# Patient Record
Sex: FEMALE | Race: BLACK OR AFRICAN AMERICAN | NOT HISPANIC OR LATINO | URBAN - METROPOLITAN AREA
[De-identification: names, ages, dates, MRNs, and addresses within clinical notes are randomized per-mention and may not be internally consistent; named-entity substitution may affect disease eponyms.]

---

## 2022-07-05 ENCOUNTER — HOSPITAL ENCOUNTER (EMERGENCY)
Facility: HOSPITAL | Age: 22
Discharge: HOME | End: 2022-07-05
Attending: EMERGENCY MEDICINE
Payer: MEDICAID

## 2022-07-05 VITALS
OXYGEN SATURATION: 99 % | HEIGHT: 64 IN | SYSTOLIC BLOOD PRESSURE: 131 MMHG | BODY MASS INDEX: 28.17 KG/M2 | WEIGHT: 165 LBS | TEMPERATURE: 99 F | RESPIRATION RATE: 20 BRPM | HEART RATE: 90 BPM | DIASTOLIC BLOOD PRESSURE: 67 MMHG

## 2022-07-05 DIAGNOSIS — R60.0 LOWER EXTREMITY EDEMA: Primary | ICD-10-CM

## 2022-07-05 PROCEDURE — 99282 EMERGENCY DEPT VISIT SF MDM: CPT

## 2022-07-05 NOTE — Clinical Note
"Catrachito Ritter (Aarondayasia) was seen and treated in our emergency department on 7/5/2022.  She may return to work on 07/07/2022.       If you have any questions or concerns, please don't hesitate to call.      Allyssa Garcia RN    "

## 2022-07-05 NOTE — ED PROVIDER NOTES
Encounter Date: 7/5/2022    SCRIBE #1 NOTE: I, Gracy Nevarez, am scribing for, and in the presence of, Caio Barth MD.       History     Chief Complaint   Patient presents with    Foot Pain     Bilateral foot pain for over a week, worsen with standing, was suppose to go to work this am but reports swelling in feet. No trauma noted and no discoloration noted. +pedal pulse in BLE     Catrachito Ritter is a 22 y.o. female who  has no past medical history on file.    The patient presents to the ED for evaluation of bilateral feet swelling and pain.   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 However, she states her swelling and pain to bilateral lower extremities progressively worsened over this last week. Her pain is exacerbated when she is standing/applying pressure to her feet. She also reports decreased appetite. She denies any shortness of breath. No other complaints reported at this time.     The history is provided by the patient. No  was used.     Review of patient's allergies indicates:  No Known Allergies  No past medical history on file.  No past surgical history on file.  No family history on file.     Review of Systems   Constitutional: Positive for appetite change.   Respiratory: Negative for shortness of breath.    Cardiovascular: Positive for leg swelling.   Musculoskeletal: Positive for myalgias.   Skin: Negative for color change.   Neurological: Negative for numbness.   All other systems reviewed and are negative.      Physical Exam     Initial Vitals [07/05/22 0734]   BP Pulse Resp Temp SpO2   131/67 90 20 98.9 °F (37.2 °C) 99 %      MAP       --         Physical Exam    Nursing note and vitals reviewed.  Constitutional: No distress.   HENT:   Head: Atraumatic.   Eyes: Conjunctivae and EOM are normal.   Neck: Neck supple.   Cardiovascular: Normal rate, regular rhythm and normal heart sounds.    Pulmonary/Chest: Breath sounds normal. No respiratory distress.   Musculoskeletal:      Cervical back: Neck supple.      Comments: Trace edema to bilateral lower extremities.  No calf tenderness.  Negative Homans sign bilaterally.  Palpable pulses in both feet.     Neurological: She is alert and oriented to person, place, and time.   Skin: Skin is warm and dry. No erythema.   Psychiatric: She has a normal mood and affect. Thought content normal.         ED Course   Procedures  Labs Reviewed - No data to display       Imaging Results    None          Medications - No data to display  Medical Decision Making:   Initial Assessment:   22-year-old female presents to the ED for evaluation of bilateral feet swelling and pain.   ED Management:  Patient with dependent edema of both lower legs.  I have explained to the patient to elevate her legs today and then apply compression stockings.  I have no suspicions of a DVT.  She may follow-up with the primary physician.  She is also welcome to return to the ED for any possible worsening.          Scribe Attestation:   Scribe #1: I performed the above scribed service and the documentation accurately describes the services I performed. I attest to the accuracy of the note.                 Clinical Impression:   Final diagnoses:  [R60.0] Lower extremity edema (Primary)          ED Disposition Condition    Discharge Stable        ED Prescriptions     None        Follow-up Information     Follow up With Specialties Details Why Contact Info    Your primary physician             I, Dr. Caio Barth, personally performed the services described in this documentation. All medical record entries made by the scribe were at my direction and in my presence. I have reviewed the chart and agree that the record reflects my personal performance and is accurate and complete. Caio Barth MD.  11:20 AM 07/05/2022         Caio Barth MD  07/05/22 1121

## 2024-06-10 ENCOUNTER — HOSPITAL ENCOUNTER (EMERGENCY)
Facility: HOSPITAL | Age: 24
Discharge: HOME | End: 2024-06-10
Attending: EMERGENCY MEDICINE
Payer: MEDICAID

## 2024-06-10 VITALS
HEART RATE: 70 BPM | DIASTOLIC BLOOD PRESSURE: 67 MMHG | SYSTOLIC BLOOD PRESSURE: 108 MMHG | TEMPERATURE: 99 F | WEIGHT: 170 LBS | BODY MASS INDEX: 29.02 KG/M2 | HEIGHT: 64 IN | RESPIRATION RATE: 18 BRPM | OXYGEN SATURATION: 99 %

## 2024-06-10 DIAGNOSIS — R76.12 POSITIVE QUANTIFERON-TB GOLD TEST: ICD-10-CM

## 2024-06-10 LAB
B-HCG UR QL: NEGATIVE
CTP QC/QA: YES

## 2024-06-10 PROCEDURE — 81025 URINE PREGNANCY TEST: CPT | Performed by: EMERGENCY MEDICINE

## 2024-06-10 PROCEDURE — 99283 EMERGENCY DEPT VISIT LOW MDM: CPT | Mod: 25

## 2024-06-10 NOTE — DISCHARGE INSTRUCTIONS
Your chest x-ray does not show any active signs of TB.   Follow up with PCP.    Imaging Results              X-Ray Chest PA And Lateral (Final result)  Result time 06/10/24 12:17:29      Final result by Antonio Cervantes MD (06/10/24 12:17:29)                   Impression:      1. No acute cardiopulmonary process.      Electronically signed by: Antonio Cervantes MD  Date:    06/10/2024  Time:    12:17               Narrative:    EXAMINATION:  XR CHEST PA AND LATERAL    CLINICAL HISTORY:  Nonspecific reaction to cell mediated immunity measurement of gamma interferon antigen response without active tuberculosis    TECHNIQUE:  PA and lateral views of the chest were performed.    COMPARISON:  None    FINDINGS:  The cardiomediastinal silhouette is not enlarged.  There is no pleural effusion.  The trachea is midline.  The lungs are symmetrically expanded bilaterally without evidence of acute parenchymal process. No large focal consolidation seen.  There is no pneumothorax.  The osseous structures are unremarkable.

## 2024-06-10 NOTE — ED NOTES
Patient identifiers verified and correct for Catrachito Ritter  LOC: The patient is awake, alert and aware of environment with an appropriate affect, the patient is oriented x 3 and speaking appropriately.   APPEARANCE: Patient appears comfortable and in no acute distress, patient is clean and well groomed.  SKIN: The skin is warm and dry, color consistent with ethnicity, patient has normal skin turgor and moist mucus membranes, skin intact, no breakdown or bruising noted.   MUSCULOSKELETAL: Patient moving all extremities spontaneously, no swelling noted.  RESPIRATORY: Airway is open and patent, respirations are spontaneous, patient has a normal effort and rate, no accessory muscle use noted, O2 Sat 97% on room air.  CARDIAC: Patient has a normal rate and regular rhythm, no edema noted, capillary refill < 3 seconds.   GASTRO: Soft and non tender to palpation, no distention noted, Pt states bowel movements have been regular.  : Pt denies any pain or frequency with urination.  NEURO: Pt opens eyes spontaneously, behavior appropriate to situation, follows commands, facial expression symmetrical, bilateral hand grasp equal and even, purposeful motor response noted, normal sensation in all extremities when touched with a finger.

## 2024-06-10 NOTE — Clinical Note
"Catrachito Peñagerald" Riya was seen and treated in our emergency department on 6/10/2024.  She may return to work on 06/11/2024.       If you have any questions or concerns, please don't hesitate to call.      Sherry Germain PA-C"

## 2024-06-10 NOTE — ED PROVIDER NOTES
Encounter Date: 6/10/2024       History     Chief Complaint   Patient presents with    Multiple complaints     Wanting cxr,  for employment at [employer]     11:53 AM    Patient is a 24-year-old female who presents to [facility] ED with a positive QuantiFERON test.      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  She had blood work done a few days ago and her results came back positive for QuantiFERON test.  She denies any and all symptoms including weakness, fatigue, night sweats, weight loss, cough, shortness of breath, chest pain.    I spoke with someone who is managing her test results and they state that she can have her chest x-ray done anywhere patient chooses to and that it would just need to say that she does not have any active disease for her to continue through with her employment process.        Review of patient's allergies indicates:  No Known Allergies  No past medical history on file.  No past surgical history on file.  No family history on file.     Review of Systems   Constitutional:  Negative for activity change, appetite change, chills, diaphoresis, fatigue and fever.   HENT:  Negative for sore throat.    Respiratory:  Negative for cough and shortness of breath.    Cardiovascular:  Negative for chest pain.   Gastrointestinal:  Negative for diarrhea, nausea and vomiting.   Genitourinary:  Negative for dysuria.   Musculoskeletal:  Negative for back pain.   Skin:  Negative for rash.   Neurological:  Negative for weakness.   Hematological:  Does not bruise/bleed easily.       Physical Exam     Initial Vitals [06/10/24 1024]   BP Pulse Resp Temp SpO2   108/67 70 18 98.6 °F (37 °C) 99 %      MAP       --         Physical Exam    Vitals reviewed.  Constitutional: She appears well-developed and well-nourished. She is not diaphoretic. She is cooperative.  Non-toxic appearance. She does not have a sickly appearance. She does not appear ill. No distress.   HENT:   Head:  Normocephalic and atraumatic.   Nose: Nose normal.   Mouth/Throat: No trismus in the jaw.   Eyes: Conjunctivae and EOM are normal.   Neck:   Normal range of motion.  Cardiovascular:  Normal rate and regular rhythm.           Pulmonary/Chest: Breath sounds normal. No accessory muscle usage. No tachypnea. No respiratory distress. She has no wheezes. She has no rhonchi. She has no rales.     No hypoxia.  Speaking in clear and full sentences.  No cough.   Abdominal: She exhibits no distension.   Musculoskeletal:         General: Normal range of motion.      Cervical back: Normal range of motion.     Neurological: She is alert. She has normal strength.   Skin: Skin is warm and dry. No erythema. No pallor.         ED Course   Procedures  Labs Reviewed   POCT URINE PREGNANCY          Imaging Results              X-Ray Chest PA And Lateral (Final result)  Result time 06/10/24 12:17:29      Final result by Antonio Cervantes MD (06/10/24 12:17:29)                   Impression:      1. No acute cardiopulmonary process.      Electronically signed by: Antonio Cervantes MD  Date:    06/10/2024  Time:    12:17               Narrative:    EXAMINATION:  XR CHEST PA AND LATERAL    CLINICAL HISTORY:  Nonspecific reaction to cell mediated immunity measurement of gamma interferon antigen response without active tuberculosis    TECHNIQUE:  PA and lateral views of the chest were performed.    COMPARISON:  None    FINDINGS:  The cardiomediastinal silhouette is not enlarged.  There is no pleural effusion.  The trachea is midline.  The lungs are symmetrically expanded bilaterally without evidence of acute parenchymal process. No large focal consolidation seen.  There is no pneumothorax.  The osseous structures are unremarkable.                                       Medications - No data to display  Medical Decision Making    Patient is a 24-year-old female who presents to [facility] ED with a positive QuantiFERON test.      Differential diagnosis  includes but is not limited to exposure, latent TB, active TB.   Vitals are stable.  She is nontoxic appearing.  She is asymptomatic.      Patient would like a chest x-ray in this emergency department at this time for her positive result.  I will obtain one now.    Amount and/or Complexity of Data Reviewed  External Data Reviewed: labs.     Details: Received verbal results over phone as well as looked at electronic documents on her phone.   Labs:  Decision-making details documented in ED Course.  Radiology: ordered.               ED Course as of 06/10/24 1510   Mon Nrom 10, 2024   1136 BP: 108/67 [CL]   1136 Temp: 98.6 °F (37 °C) [CL]   1136 Pulse: 70 [CL]   1136 Resp: 18 [CL]   1136 SpO2: 99 % [CL]   1148 hCG Qualitative, Urine: Negative [CL]      ED Course User Index  [CL] Sherry Germain PA-C          CXR without acute process.      Patient updated with results.  She was provided with her impressions.  She has access to [facility].  All of her questions were answered.  Follow up closely.                 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

## 2024-10-04 ENCOUNTER — HOSPITAL ENCOUNTER (EMERGENCY)
Facility: HOSPITAL | Age: 24
Discharge: HOME | End: 2024-10-04
Attending: EMERGENCY MEDICINE
Payer: MEDICAID

## 2024-10-04 VITALS
TEMPERATURE: 99 F | OXYGEN SATURATION: 100 % | RESPIRATION RATE: 18 BRPM | HEART RATE: 56 BPM | BODY MASS INDEX: 29.02 KG/M2 | DIASTOLIC BLOOD PRESSURE: 60 MMHG | WEIGHT: 170 LBS | HEIGHT: 64 IN | SYSTOLIC BLOOD PRESSURE: 121 MMHG

## 2024-10-04 DIAGNOSIS — S05.02XA CORNEAL ABRASION, LEFT, INITIAL ENCOUNTER: Primary | ICD-10-CM

## 2024-10-04 LAB
B-HCG UR QL: NEGATIVE
CTP QC/QA: YES

## 2024-10-04 PROCEDURE — 25000003 PHARM REV CODE 250: Performed by: PHYSICIAN ASSISTANT

## 2024-10-04 PROCEDURE — 99283 EMERGENCY DEPT VISIT LOW MDM: CPT

## 2024-10-04 PROCEDURE — 81025 URINE PREGNANCY TEST: CPT | Performed by: PHYSICIAN ASSISTANT

## 2024-10-04 RX ORDER — PROPARACAINE HYDROCHLORIDE 5 MG/ML
2 SOLUTION/ DROPS OPHTHALMIC
Status: COMPLETED | OUTPATIENT
Start: 2024-10-04 | End: 2024-10-04

## 2024-10-04 RX ORDER — ERYTHROMYCIN 5 MG/G
OINTMENT OPHTHALMIC
Status: COMPLETED | OUTPATIENT
Start: 2024-10-04 | End: 2024-10-04

## 2024-10-04 RX ORDER — ERYTHROMYCIN 5 MG/G
OINTMENT OPHTHALMIC EVERY 8 HOURS
Qty: 1 G | Refills: 0 | Status: SHIPPED | OUTPATIENT
Start: 2024-10-04 | End: 2024-10-09

## 2024-10-04 RX ADMIN — ERYTHROMYCIN: 5 OINTMENT OPHTHALMIC at 08:10

## 2024-10-04 RX ADMIN — FLUORESCEIN SODIUM 1 EACH: 1 STRIP OPHTHALMIC at 08:10

## 2024-10-04 RX ADMIN — PROPARACAINE HYDROCHLORIDE 2 DROP: 5 SOLUTION/ DROPS OPHTHALMIC at 08:10

## 2024-10-04 NOTE — Clinical Note
"Catrachito Ritter ([place]ondayasia) was seen and treated in our emergency department on 10/4/2024.  She may return to work on 10/05/2024.       If you have any questions or concerns, please don't hesitate to call.      EBONY MIRANDA    "

## 2024-10-04 NOTE — ED NOTES
Discharge home, states understanding to follow up as directed. Ambulates out of ED without difficulty.Rx given, med before discharge

## 2024-10-04 NOTE — Clinical Note
"Catrachito Schwab" Riya was seen and treated in our emergency department on 10/4/2024.  She may return to work on 10/04/2024.       If you have any questions or concerns, please don't hesitate to call.      EBONY MIRANDA RN    "

## 2024-10-04 NOTE — ED PROVIDER NOTES
Encounter Date: 10/4/2024       History     Chief Complaint   Patient presents with    Eye Problem     Pt. Reporting eye redness, pain, and draining from left eye beginning yesterday     Patient is a 24-year-old female.  She denies any significant past medical history.  She presents to the ER for an urgent evaluation complaining of acute left eye redness, irritation, and foreign body sensation.  She states symptoms have been present for the past 2 days.  She denies contact lens use or any previous eye surgery/problems in the past.  She does wear glasses.  She states that her eye has been tearing/watering.  She denies any purulent drainage.  She denies any matting or crusting.  She denies any loss of vision or vision change.  She has tried using artificial tears with minimal improvement.  She states that she has been rubbing her eye.  She denies any known injury or trauma to the eye, but thinks she may have scratched her eye inadvertently.  She denies any chemical splash.  No additional symptoms or concerns reported.  No known exposure to pink eye.      Review of patient's allergies indicates:  No Known Allergies  History reviewed. No pertinent past medical history.  History reviewed. No pertinent surgical history.  No family history on file.  Social History     Tobacco Use    Smoking status: Never    Smokeless tobacco: Never   Substance Use Topics    Alcohol use: Not Currently    Drug use: Not Currently     Review of Systems   Constitutional:  Negative for chills and fever.   HENT:  Negative for congestion, ear pain, facial swelling, nosebleeds, postnasal drip, rhinorrhea, sinus pressure, sinus pain, sneezing and sore throat.    Eyes:  Positive for pain and redness. Negative for visual disturbance.   Respiratory:  Negative for cough and shortness of breath.    Cardiovascular:  Negative for chest pain.   Gastrointestinal:  Negative for abdominal pain, diarrhea, nausea and vomiting.   Genitourinary:  Negative for  difficulty urinating and menstrual problem.   Musculoskeletal:  Negative for back pain and neck pain.   Skin:  Negative for color change and rash.   Allergic/Immunologic: Negative for immunocompromised state.   Neurological:  Negative for dizziness, light-headedness and headaches.   Psychiatric/Behavioral:  Negative for confusion.        Physical Exam     Initial Vitals [10/04/24 0720]   BP Pulse Resp Temp SpO2   121/60 (!) 56 18 98.5 °F (36.9 °C) 100 %      MAP       --         Physical Exam    Nursing note and vitals reviewed.  Constitutional: She appears well-developed and well-nourished. She is not diaphoretic.   HENT:   Head: Normocephalic and atraumatic.   Eyes: EOM are normal. Pupils are equal, round, and reactive to light. Right eye exhibits no discharge. Left eye exhibits no discharge.   Left eye with mild diffuse injection.  No periorbital swelling.  No proptosis or ptosis.  No lid swelling.  Pupils are equal round and reactive.  Extraocular movements are intact.  No pain with ocular movement reported.  No purulent drainage.  Application of proparacaine drops immediately and completely relieves pain.  Fluorescein stain applied and there is uptake over the visual axis and at the 9 o'clock position with small superficial corneal scratch/abrasion noted.  No corneal ulcer or foreign body identified.  No rust ring.  No hemorrhage or hyphema.  No globe injury.  Intra-ocular pressure in normal range.  Eye copiously irrigated with normal saline.  Erythromycin ophthalmic ointment ordered/applied.   Neck: Neck supple.   Cardiovascular:  Normal rate.           Pulmonary/Chest: No respiratory distress.   Abdominal: She exhibits no distension. There is no abdominal tenderness.   Musculoskeletal:         General: Normal range of motion.      Cervical back: Neck supple.     Neurological: She is alert and oriented to person, place, and time. She has normal strength. No sensory deficit.   Skin: Skin is warm and dry. No  rash noted.   Psychiatric: She has a normal mood and affect. Her behavior is normal.         ED Course   Procedures  Labs Reviewed   HIV 1 / 2 ANTIBODY   HEPATITIS C ANTIBODY   POCT URINE PREGNANCY          Imaging Results    None          Medications   erythromycin 5 mg/gram (0.5 %) ophthalmic ointment (has no administration in time range)   fluorescein ophthalmic strip 1 each (1 each Left Eye Given by Other 10/4/24 0816)   proparacaine 0.5 % ophthalmic solution 2 drop (2 drops Left Eye Given by Other 10/4/24 0816)     Medical Decision Making                        Medical Decision Making:   Initial Assessment:   24-year-old female presents to the ER for an urgent evaluation complaining of acute atraumatic left eye redness, irritation, tearing, and foreign body sensation x2 days.  No known injury, trauma, or splash.  Does not wear contact lenses.  No previous eye surgeries.  Denies any vision loss or change.  Differential Diagnosis:   Conjunctivitis, corneal abrasion, iritis, uveitis, etc.  ED Management:  Vital signs reviewed, benign   Chart reviewed completed   Visual acuity intact   Intra-ocular pressure in normal range   Minor superficial corneal abrasion with fluorescein uptake noted on exam, does overlie the visual axis.  I was copiously irrigated with normal saline and erythromycin ophthalmic ointment was ordered.  Ambulatory referral to Ophthalmology order was placed and patient was advised to follow up on Monday for re check.  Tylenol advised for any pain.  Return precautions advised.  Patient verbalized understanding and comfort with plan.             Clinical Impression:  Final diagnoses:  [S05.02XA] Corneal abrasion, left, initial encounter (Primary)                 Senthil Weston PA-C  10/04/24 7079

## 2025-06-30 ENCOUNTER — HOSPITAL ENCOUNTER (EMERGENCY)
Facility: HOSPITAL | Age: 25
Discharge: HOME | End: 2025-06-30
Attending: STUDENT IN AN ORGANIZED HEALTH CARE EDUCATION/TRAINING PROGRAM
Payer: MEDICAID

## 2025-06-30 VITALS
BODY MASS INDEX: 24.25 KG/M2 | SYSTOLIC BLOOD PRESSURE: 130 MMHG | WEIGHT: 160 LBS | OXYGEN SATURATION: 98 % | HEIGHT: 68 IN | TEMPERATURE: 98 F | RESPIRATION RATE: 18 BRPM | DIASTOLIC BLOOD PRESSURE: 67 MMHG | HEART RATE: 77 BPM

## 2025-06-30 DIAGNOSIS — O21.9 NAUSEA/VOMITING IN PREGNANCY: Primary | ICD-10-CM

## 2025-06-30 LAB
B-HCG UR QL: POSITIVE
BACTERIA #/AREA URNS AUTO: ABNORMAL /HPF
BILIRUB UR QL STRIP.AUTO: NEGATIVE
CLARITY UR: CLEAR
COLOR UR AUTO: YELLOW
CTP QC/QA: YES
GLUCOSE UR QL STRIP: NEGATIVE
HGB UR QL STRIP: ABNORMAL
HYALINE CASTS UR QL AUTO: 0 /LPF (ref 0–1)
KETONES UR QL STRIP: ABNORMAL
LEUKOCYTE ESTERASE UR QL STRIP: NEGATIVE
MICROSCOPIC COMMENT: ABNORMAL
NITRITE UR QL STRIP: NEGATIVE
PH UR STRIP: 8 [PH]
PROT UR QL STRIP: ABNORMAL
RBC #/AREA URNS AUTO: 20 /HPF (ref 0–4)
SP GR UR STRIP: 1.02
SQUAMOUS #/AREA URNS AUTO: 4 /HPF
UROBILINOGEN UR STRIP-ACNC: 1 EU/DL
WBC #/AREA URNS AUTO: 2 /HPF (ref 0–5)
YEAST UR QL AUTO: ABNORMAL /HPF

## 2025-06-30 PROCEDURE — 99284 EMERGENCY DEPT VISIT MOD MDM: CPT

## 2025-06-30 PROCEDURE — 81001 URINALYSIS AUTO W/SCOPE: CPT | Performed by: PHYSICIAN ASSISTANT

## 2025-06-30 PROCEDURE — 81025 URINE PREGNANCY TEST: CPT

## 2025-06-30 RX ORDER — DIPHENHYDRAMINE HCL 50 MG
50 CAPSULE ORAL EVERY 6 HOURS PRN
Qty: 30 CAPSULE | Refills: 0 | Status: SHIPPED | OUTPATIENT
Start: 2025-06-30

## 2025-06-30 RX ORDER — ONDANSETRON 4 MG/1
4 TABLET, ORALLY DISINTEGRATING ORAL EVERY 6 HOURS PRN
Qty: 20 TABLET | Refills: 0 | Status: SHIPPED | OUTPATIENT
Start: 2025-06-30 | End: 2025-07-14 | Stop reason: SDUPTHER

## 2025-06-30 RX ORDER — METOCLOPRAMIDE 10 MG/1
10 TABLET ORAL EVERY 6 HOURS PRN
Qty: 30 TABLET | Refills: 0 | Status: SHIPPED | OUTPATIENT
Start: 2025-06-30

## 2025-06-30 RX ORDER — ACETAMINOPHEN 500 MG
500 TABLET ORAL EVERY 4 HOURS PRN
Qty: 20 TABLET | Refills: 0 | Status: SHIPPED | OUTPATIENT
Start: 2025-06-30 | End: 2025-07-05

## 2025-06-30 NOTE — ED PROVIDER NOTES
Encounter Date: 6/30/2025    SCRIBE #1 NOTE: I, Sharon Krause, am scribing for, and in the presence of,  Olga Kate PA-C. I have scribed the following portions of the note - Other sections scribed: HPI, ROS, PE.       History     Chief Complaint   Patient presents with    Abdominal Pain     PT to ER with reports of generalized abd pain with nausea and vomiting.  Pt unsure of last menses      CC: abdominal pain    HPI: 24 yo F, with no pertinent PMHx, presents to the ED for evaluation of 1-2 week history of intermittent generalized abdominal pain. She reports she is not having abdominal pain currently and just feels hungry. Patient reports she began having nausea and vomiting 2 days ago. She reports she has also noticed urinary frequency since last week. She reports LMP was 5/16/2025.  A proximally 6 weeks 3 days gravid based on LMP. She denies history of pregnancy, miscarriages, or abortions. She denies back pain, dysuria, vaginal bleeding, vaginal discharge, or other associated symptoms.    The history is provided by the patient. No  was used.     Review of patient's allergies indicates:  No Known Allergies  History reviewed. No pertinent past medical history.  History reviewed. No pertinent surgical history.  No family history on file.  Social History[1]  Review of Systems   Constitutional:  Negative for chills and fever.   HENT:  Negative for congestion, dental problem, ear pain, nosebleeds, rhinorrhea, sore throat and trouble swallowing.    Eyes:  Negative for redness.   Respiratory:  Negative for cough, shortness of breath and stridor.    Cardiovascular:  Negative for chest pain.   Gastrointestinal:  Positive for nausea and vomiting. Negative for abdominal pain, constipation and diarrhea.   Genitourinary:  Positive for frequency. Negative for decreased urine volume, dysuria, hematuria, urgency, vaginal bleeding and vaginal discharge.   Musculoskeletal:  Negative for back pain and  neck pain.   Skin:  Negative for rash and wound.   Neurological:  Negative for dizziness, speech difficulty, weakness, light-headedness, numbness and headaches.   Hematological:  Does not bruise/bleed easily.   Psychiatric/Behavioral:  Negative for confusion.        Physical Exam     Initial Vitals [06/30/25 1552]   BP Pulse Resp Temp SpO2   130/67 77 18 98.2 °F (36.8 °C) 98 %      MAP       --         Physical Exam    Nursing note and vitals reviewed.  Constitutional: Vital signs are normal. She appears well-developed and well-nourished. She is not diaphoretic.  Non-toxic appearance. No distress.   HENT:   Head: Normocephalic and atraumatic.   Right Ear: External ear normal.   Left Ear: External ear normal.   Eyes: Conjunctivae and EOM are normal. Pupils are equal, round, and reactive to light. Right eye exhibits no discharge. Left eye exhibits no discharge. No scleral icterus.   Neck: Neck supple. No tracheal deviation present.   Normal range of motion.  Cardiovascular:  Normal rate, regular rhythm, normal heart sounds and intact distal pulses.           Pulmonary/Chest: Breath sounds normal. No stridor. No respiratory distress. She has no wheezes. She has no rhonchi. She has no rales.   Abdominal: Abdomen is soft. Bowel sounds are normal. She exhibits no distension. There is no abdominal tenderness. There is no rebound and no guarding.   Musculoskeletal:         General: Normal range of motion.      Cervical back: Normal range of motion and neck supple. No rigidity. No spinous process tenderness.     Neurological: She is alert. She has normal strength. No sensory deficit. She displays a negative Romberg sign.   Skin: Skin is warm and dry. No rash noted. No erythema.   Psychiatric: She has a normal mood and affect. Her behavior is normal. Judgment and thought content normal.         ED Course   Procedures  Labs Reviewed   URINALYSIS, REFLEX TO URINE CULTURE - Abnormal       Result Value    Color, UA Yellow       Appearance, UA Clear      pH, UA 8.0      Spec Grav UA 1.020      Protein, UA 1+ (*)     Glucose, UA Negative      Ketones, UA Trace (*)     Bilirubin, UA Negative      Blood, UA 1+ (*)     Nitrites, UA Negative      Urobilinogen, UA 1.0      Leukocyte Esterase, UA Negative     URINALYSIS MICROSCOPIC - Abnormal    RBC, UA 20 (*)     WBC, UA 2      Bacteria, UA Few (*)     Yeast, UA None      Squamous Epithelial Cells, UA 4      Hyaline Casts, UA 0      Microscopic Comment       POCT URINE PREGNANCY - Abnormal    POC Preg Test, Ur Positive (*)      Acceptable Yes     GREY TOP URINE HOLD          Imaging Results    None          Medications - No data to display  Medical Decision Making  25-year-old female presenting for evaluation of abdominal pain with nausea vomiting. .  No abdominal pain at this time.  Has not urinary frequency.  UPT positive.  Patient is unaware of pregnancy.  Six weeks 3 days gravid based on LMP.  Abdomen soft nontender.  No abdominal pain at this time.  Denies pelvic pain vaginal bleeding.  Considered but low suspicion for ectopic pregnancy.  UA with 2 WBCs only few bacteria 20 RBCs trace ketones few bacteria.  She denies any vaginal bleeding.  Will have her follow up with OBGYN in 1-2 days return ER for worsening or as needed.  She it is not hypotensive or tachycardic.  Denies any nausea currently.  Will discharge with medications for symptomatic treatment.  Follow up with primary care in 2 days return ER for worsening or as needed.    Amount and/or Complexity of Data Reviewed  Labs: ordered. Decision-making details documented in ED Course.  Radiology: ordered. Decision-making details documented in ED Course.    Risk  OTC drugs.  Prescription drug management.            Scribe Attestation:   Scribe #1: I performed the above scribed service and the documentation accurately describes the services I performed. I attest to the accuracy of the note.                             I,  Olga Kate PA-C , personally performed the services described in this documentation. All medical record entries made by the scribe were at my direction and in my presence. I have reviewed the chart and agree that the record reflects my personal performance and is accurate and complete.      DISCLAIMER: This note was prepared with [employer] voice recognition transcription software. Garbled syntax, mangled pronouns, and other bizarre constructions may be attributed to that software system.    Clinical Impression:  Final diagnoses:  [O21.9] Nausea/vomiting in pregnancy (Primary)          ED Disposition Condition    Discharge Stable          ED Prescriptions       Medication Sig Dispense Start Date End Date Auth. Provider    ondansetron (ZOFRAN-ODT) 4 MG TbDL Take 1 tablet (4 mg total) by mouth every 6 (six) hours as needed (for nausea). 20 tablet 6/30/2025 -- Olga Kate PA-C    acetaminophen (TYLENOL) 500 MG tablet Take 1 tablet (500 mg total) by mouth every 4 (four) hours as needed. 20 tablet 6/30/2025 7/5/2025 Olga Kate PA-C    metoclopramide HCl (REGLAN) 10 MG tablet Take 1 tablet (10 mg total) by mouth every 6 (six) hours as needed (for nausea). 30 tablet 6/30/2025 -- Olga Kate PA-C    diphenhydrAMINE (BENADRYL) 50 MG capsule Take 1 capsule (50 mg total) by mouth every 6 (six) hours as needed for Itching (30 minutes prior to taking reglan). 30 capsule 6/30/2025 -- Olga Kate PA-C          Follow-up Information       Follow up With Specialties Details Why Contact Info    [facility] Obstetrics and Gynecology Schedule an appointment as soon as possible for a visit in 2 days for follow up [address] [facility] [place] [id number] [phone]    [facility] Emergency Medicine Go to  As needed, If symptoms worsen [address] [facility] [place]  [id number]  [phone]                   [1]   Social History  Tobacco Use    Smoking status: Never    Smokeless tobacco: Never   Substance Use Topics    Alcohol use: Not Currently    Drug use: Not Currently        Olga Kate PA-C  06/30/25 1822

## 2025-07-01 LAB — HOLD SPECIMEN: NORMAL

## 2025-07-08 ENCOUNTER — CLINICAL SUPPORT (OUTPATIENT)
Dept: OBSTETRICS AND GYNECOLOGY | Facility: CLINIC | Age: 25
End: 2025-07-08
Payer: MEDICAID

## 2025-07-08 ENCOUNTER — PATIENT MESSAGE (OUTPATIENT)
Dept: OBSTETRICS AND GYNECOLOGY | Facility: CLINIC | Age: 25
End: 2025-07-08
Payer: MEDICAID

## 2025-07-08 DIAGNOSIS — N91.2 AMENORRHEA: Primary | ICD-10-CM

## 2025-07-08 PROCEDURE — 99999 PR PBB SHADOW E&M-EST. PATIENT-LVL II: CPT | Mod: PBBFAC,,,

## 2025-07-08 PROCEDURE — 99212 OFFICE O/P EST SF 10 MIN: CPT | Mod: PBBFAC

## 2025-07-08 NOTE — PROGRESS NOTES
Spoke with patient for approximately 30 minutes during OB navigator virtual visit.  Updated chart to reflect up to date patient demographics.  Allergies, medications, pharmacy, medical, surgical and family history updated. Substance and sexual activity, marital status, gender identity and OB/Gyn history updated.   Patient was guided through expectations of care during pregnancy. Pregnancy confirmation, dating u/s & first OB appts scheduled.  New pregnancy education provided & questions answered. Encouraged to send message or call office with any questions/concerns. Verbalized understanding.     Discussed with pt:    Lmp 5/31/25;   Encouraged to cont taking PNV daily   denies n/v:   common in 1st tri  discussed safe options per Pregnancy A to Z guide   denies cramping/spotting:   may be normal to have mild cramping/light spotting, theresa in 1st tri & with sexual activity  Precautions/warning signs discussed:   encouraged to go to ED for excessive vag d/c, saturating a pad, bright red bleeding, painful cramping worse than menstrual cramps/abd pain that is interrupting daily activity  Encouraged and discussed healthy diet:   nothing raw; meat well done; heat deli meats & hot dogs prior to eating   avoid soft cheeses that are not fully pasteurized-ex: brie, feta, blue cheese   avoid fish high in mercury-limit canned tuna to once per week   rinse fruits/veggies thoroughly    Encouraged adequate fluids:  8-12 cups of water/healthy liquids each day  Limit caffeine to <200 mg/day (approx 1-2 cups coffee, tea or soda)   Extra rest can be beneficial, especially during the 1st trimester when it's normal to feel tired   Sleep on side rather than back or abdomen, especially as pregnancy progresses  Ok to continue to exercise but should not start anything new or more strenuous  Normal weight gain:  1st trimester-may not gain anything at all or up to 5 lbs   Total weight gain approximately 25-35 lbs depending on starting weight    InTender[employer] message to pt through pt portal with information regarding the "[email or web address] for access to the Pregnancy A to Z guide and Prenatal Class schedule. Informed of ConnectedMOM program & encouraged to participate, Get Ready to Meet Your Baby pamphlet, Coffective yuridia and how to obtain a breast pump through insurance toward end of pregnancy

## 2025-07-14 ENCOUNTER — HOSPITAL ENCOUNTER (EMERGENCY)
Facility: HOSPITAL | Age: 25
Discharge: HOME | End: 2025-07-14
Attending: STUDENT IN AN ORGANIZED HEALTH CARE EDUCATION/TRAINING PROGRAM
Payer: MEDICAID

## 2025-07-14 VITALS
HEART RATE: 78 BPM | OXYGEN SATURATION: 100 % | RESPIRATION RATE: 20 BRPM | WEIGHT: 173.75 LBS | HEIGHT: 64 IN | TEMPERATURE: 98 F | DIASTOLIC BLOOD PRESSURE: 71 MMHG | SYSTOLIC BLOOD PRESSURE: 134 MMHG | BODY MASS INDEX: 29.66 KG/M2

## 2025-07-14 DIAGNOSIS — R51.9 ACUTE NONINTRACTABLE HEADACHE, UNSPECIFIED HEADACHE TYPE: ICD-10-CM

## 2025-07-14 DIAGNOSIS — R11.2 NAUSEA AND VOMITING, UNSPECIFIED VOMITING TYPE: Primary | ICD-10-CM

## 2025-07-14 DIAGNOSIS — O46.90 VAGINAL BLEEDING IN PREGNANCY: ICD-10-CM

## 2025-07-14 DIAGNOSIS — R10.2 PELVIC PAIN: ICD-10-CM

## 2025-07-14 LAB
BACTERIA #/AREA URNS AUTO: ABNORMAL /HPF
BILIRUB UR QL STRIP.AUTO: NEGATIVE
CLARITY UR: CLEAR
COLOR UR AUTO: YELLOW
GLUCOSE UR QL STRIP: NEGATIVE
HCG INTACT+B SERPL-ACNC: 45.99 MIU/ML
HGB UR QL STRIP: ABNORMAL
KETONES UR QL STRIP: NEGATIVE
LEUKOCYTE ESTERASE UR QL STRIP: NEGATIVE
MICROSCOPIC COMMENT: ABNORMAL
NITRITE UR QL STRIP: NEGATIVE
PH UR STRIP: 6 [PH]
POCT GLUCOSE: 86 MG/DL (ref 70–110)
PROT UR QL STRIP: ABNORMAL
RBC #/AREA URNS AUTO: 20 /HPF (ref 0–4)
RH BLD: NORMAL
SP GR UR STRIP: >=1.03
SQUAMOUS #/AREA URNS AUTO: 7 /HPF
UROBILINOGEN UR STRIP-ACNC: ABNORMAL EU/DL
WBC #/AREA URNS AUTO: 6 /HPF (ref 0–5)

## 2025-07-14 PROCEDURE — 99285 EMERGENCY DEPT VISIT HI MDM: CPT | Mod: 25

## 2025-07-14 PROCEDURE — 82962 GLUCOSE BLOOD TEST: CPT

## 2025-07-14 PROCEDURE — 96361 HYDRATE IV INFUSION ADD-ON: CPT

## 2025-07-14 PROCEDURE — 87086 URINE CULTURE/COLONY COUNT: CPT | Performed by: PHYSICIAN ASSISTANT

## 2025-07-14 PROCEDURE — 96365 THER/PROPH/DIAG IV INF INIT: CPT

## 2025-07-14 PROCEDURE — 25000003 PHARM REV CODE 250: Performed by: PHYSICIAN ASSISTANT

## 2025-07-14 PROCEDURE — 87491 CHLMYD TRACH DNA AMP PROBE: CPT | Performed by: PHYSICIAN ASSISTANT

## 2025-07-14 PROCEDURE — 81001 URINALYSIS AUTO W/SCOPE: CPT | Performed by: PHYSICIAN ASSISTANT

## 2025-07-14 PROCEDURE — 86901 BLOOD TYPING SEROLOGIC RH(D): CPT | Performed by: PHYSICIAN ASSISTANT

## 2025-07-14 PROCEDURE — 84702 CHORIONIC GONADOTROPIN TEST: CPT | Performed by: PHYSICIAN ASSISTANT

## 2025-07-14 PROCEDURE — 96375 TX/PRO/DX INJ NEW DRUG ADDON: CPT

## 2025-07-14 PROCEDURE — 63600175 PHARM REV CODE 636 W HCPCS: Performed by: PHYSICIAN ASSISTANT

## 2025-07-14 RX ORDER — ACETAMINOPHEN 500 MG
1000 TABLET ORAL
Status: COMPLETED | OUTPATIENT
Start: 2025-07-14 | End: 2025-07-14

## 2025-07-14 RX ORDER — ONDANSETRON 4 MG/1
4 TABLET, ORALLY DISINTEGRATING ORAL EVERY 6 HOURS PRN
Qty: 20 TABLET | Refills: 0 | Status: SHIPPED | OUTPATIENT
Start: 2025-07-14

## 2025-07-14 RX ORDER — ONDANSETRON HYDROCHLORIDE 2 MG/ML
4 INJECTION, SOLUTION INTRAVENOUS
Status: COMPLETED | OUTPATIENT
Start: 2025-07-14 | End: 2025-07-14

## 2025-07-14 RX ADMIN — DEXTROSE AND SODIUM CHLORIDE 1000 ML: 5; 900 INJECTION, SOLUTION INTRAVENOUS at 09:07

## 2025-07-14 RX ADMIN — ONDANSETRON 4 MG: 2 INJECTION INTRAMUSCULAR; INTRAVENOUS at 09:07

## 2025-07-14 RX ADMIN — ACETAMINOPHEN 1000 MG: 500 TABLET ORAL at 09:07

## 2025-07-14 RX ADMIN — PYRIDOXINE HYDROCHLORIDE 25 MG: 100 INJECTION, SOLUTION INTRAMUSCULAR; INTRAVENOUS at 10:07

## 2025-07-15 LAB — HOLD SPECIMEN: NORMAL

## 2025-07-15 NOTE — ED TRIAGE NOTES
Received pt from triage, awake, afebrile, not in respiratory distress, with complaints of headache started 3 days ago, nausea and vomiting, swollen bilateral legs. Pt is pregnant. No other medical history. Pt also stated she is spotting(vaginal bleed) Placed to bed.

## 2025-07-15 NOTE — ED PROVIDER NOTES
"Encounter Date: 7/14/2025       History     Chief Complaint   Patient presents with    Headache     Pt UPT pos per home UPT, LMP 6/1/.  Pt presents w/ c/o frontal HA x 3 days w/ N/V.  Denies nasal congestion, fever, blood thinners or neuro deficits. Also c/o lower abd "cramps".  Denies dysuria, vaginal bleeding or discharge.      25-year-old female, G1, approx 6w1d by LMP (6/1), presents to ED with nausea, vomiting, frontal headache x one-week.    Admits to frequent postprandial nausea over the past 7 days.  Admits to associated frontal and bitemporal headache over the past 7 days.  No recent illness.  No sinus issues.  No cough.  No fever chills myalgias.  No neck pain or stiffness.  States nausea typically worsened in the evenings as well.  Despite nausea vomiting, states she is eating mostly normally, normal appetite.  Does admit to early satiety since onset of symptoms.  Denies history of reflux.  Denies upper abdominal pain.  Does admit to mild lower abdominal/pelvic cramping over the past few days.  Describes cramping has very mild menses type cramping.  Denies any urinary complaints.  Upon arrival to the ED, after given urine specimen she states she began with vaginal spotting after wiping; denies any vaginal bleeding up to this point. Chronic constipation, typically BM 2-3 times per week.  Last normal BM 2 days ago, only small hard stools since.    No history of any abdominal surgeries    LMP 06/01/2025    Denies significant past medical history    Has upcoming appointment to establish care with  @ [facility] on 7/25      Review of patient's allergies indicates:  No Known Allergies  History reviewed. No pertinent past medical history.  History reviewed. No pertinent surgical history.  No family history on file.  Social History[1]  Review of Systems   Constitutional:  Negative for appetite change, chills and fever.   Eyes:  Negative for visual disturbance.   Gastrointestinal:  Positive for " constipation (Chronic), nausea and vomiting.   Genitourinary:  Positive for pelvic pain and vaginal bleeding. Negative for dysuria and flank pain.   Musculoskeletal:  Negative for back pain, myalgias, neck pain and neck stiffness.   Neurological:  Positive for headaches. Negative for syncope.       Physical Exam     Initial Vitals [07/14/25 1956]   BP Pulse Resp Temp SpO2   (!) 114/56 67 18 98.7 °F (37.1 °C) 100 %      MAP       --         Physical Exam    Nursing note and vitals reviewed.  Constitutional: She appears well-developed and well-nourished. She is not diaphoretic. No distress.   Well-appearing and nontoxic.   HENT:   Head: Normocephalic and atraumatic.   Neck: Neck supple.   Normal range of motion.  Pulmonary/Chest: No respiratory distress.   Abdominal: Abdomen is soft. Bowel sounds are normal.   Mild, generalized gaseous distention.  Mild tenderness to suprapubic abdomen.  No flank tenderness.   Musculoskeletal:         General: Normal range of motion.      Cervical back: Normal range of motion and neck supple.     Neurological: She is alert and oriented to person, place, and time. GCS score is 15. GCS eye subscore is 4. GCS verbal subscore is 5. GCS motor subscore is 6.   Skin: Skin is warm.   Psychiatric: She has a normal mood and affect. Thought content normal.         ED Course   Procedures  Labs Reviewed   URINALYSIS, REFLEX TO URINE CULTURE - Abnormal       Result Value    Color, UA Yellow      Appearance, UA Clear      pH, UA 6.0      Spec Grav UA >=1.030 (*)     Protein, UA Trace (*)     Glucose, UA Negative      Ketones, UA Negative      Bilirubin, UA Negative      Blood, UA 2+ (*)     Nitrites, UA Negative      Urobilinogen, UA 2.0-3.0 (*)     Leukocyte Esterase, UA Negative     URINALYSIS MICROSCOPIC - Abnormal    RBC, UA 20 (*)     WBC, UA 6 (*)     Bacteria, UA Moderate (*)     Squamous Epithelial Cells, UA 7      Microscopic Comment       CULTURE, URINE   HCG, QUANTITATIVE    Beta HCG  Quant 45.99     GREY TOP URINE HOLD   C. TRACHOMATIS/N. GONORRHOEAE BY AMP DNA   GROUP & RH    Group & Rh A POS     POCT GLUCOSE    POCT Glucose 86     POCT GLUCOSE MONITORING CONTINUOUS          Imaging Results              US OB <14 Wks TransAbd & TransVag, Single Gestation (XPD) (Final result)  Result time 25 22:48:44      Final result by Bradley Duarte MD (25 22:48:44)                   Impression:      No intrauterine pregnancy or gestational sac visualized, technically pregnancy of unknown location.  Findings may relate to early pregnancy or spontaneous .  No adnexal abnormalities or significant free fluid seen to suggest ectopic pregnancy.  Recommend serial beta hCGs and repeat pelvic ultrasound as clinically warranted.      Electronically signed by: Bradley Duarte MD  Date:    2025  Time:    22:48               Narrative:    EXAMINATION:  US OB <14 WEEKS, TRANSABDOM & TRANSVAG, SINGLE GESTATION (XPD)    CLINICAL HISTORY:  pelvic pain, early pregnancy, vaginal bleeding;    TECHNIQUE:  Transabdominal sonography of the pelvis was performed, followed by transvaginal sonography to better evaluate the uterus and ovaries.    COMPARISON:  None.    FINDINGS:  The uterus measures 8 x 3 x 4 cm. Uterine parenchyma is heterogenous in echotexture.  No intrauterine pregnancy or gestational sac is seen.  Endometrium is normal in thickness measuring 10 mm.    The right ovary measures 3 x 2 x 2 cm. The left ovary measures 3 x 2 x 2 cm. Arterial and venous flow are preserved bilaterally. A possible corpus luteum cyst measuring 1.4 cm is seen in the right ovary.  No adnexal abnormalities are seen.  No significant free fluid is seen.                                       Medications   dextrose 5 % and 0.9% NaCl 5-0.9 % bolus 1,000 mL (1,000 mLs Intravenous New Bag 25)   pyridoxine (vitamin B6) (B-6) 25 mg in 0.9% NaCl 50 mL IVPB (25 mg Intravenous New Bag 25 6799)   ondansetron  injection 4 mg (4 mg Intravenous Given 7/14/25 2105)   acetaminophen tablet 1,000 mg (1,000 mg Oral Given 7/14/25 2105)     Medical Decision Making  Differential diagnosis:  Threatened miscarriage, vaginal bleeding in pregnancy, UTI, nephrolithiasis, headache disorder, sinusitis, viral illness    Amount and/or Complexity of Data Reviewed  External Data Reviewed: notes.  Labs: ordered. Decision-making details documented in ED Course.  Radiology: ordered and independent interpretation performed. Decision-making details documented in ED Course.  Discussion of management or test interpretation with external provider(s): No IUP, very low HCG.  Has upcoming appointment to establish care on 07/25.  Advised continue prenatal vitamins, p.r.n. Tylenol, p.r.n. antiemetic.  Discussed interim return precautions.  Symptoms improved on repeat exam.  Patient comfortable with plan.    Risk  OTC drugs.  Prescription drug management.               ED Course as of 07/14/25 2302   Mon Jul 14, 2025   2300 BP reassuring []   2302 Group & Rh: A POS []      ED Course User Index  [SM] Zander Chavez PA-C                               Clinical Impression:  Final diagnoses:  [O46.90] Vaginal bleeding in pregnancy  [R10.2] Pelvic pain  [R11.2] Nausea and vomiting, unspecified vomiting type (Primary)  [R51.9] Acute nonintractable headache, unspecified headache type          ED Disposition Condition    Discharge Stable          ED Prescriptions       Medication Sig Dispense Start Date End Date Auth. Provider    ondansetron (ZOFRAN-ODT) 4 MG TbDL Take 1 tablet (4 mg total) by mouth every 6 (six) hours as needed (Nausea). 20 tablet 7/14/2025 -- Zander Chavez PA-C          Follow-up Information       Follow up With Specialties Details Why Contact Info    Zahida Childers MD Obstetrics and Gynecology Go to  For reevaluation [address] [place] [id number]  [phone]      [facility] Emergency  Medicine  As needed, If symptoms worsen [address] [facility] [place] [address] [phone]                   [1]   Social History  Tobacco Use    Smoking status: Never    Smokeless tobacco: Never   Substance Use Topics    Alcohol use: Not Currently    Drug use: Not Currently        Zander Chavez PA-C  07/14/25 8833

## 2025-07-15 NOTE — DISCHARGE INSTRUCTIONS
Zofran as needed for nausea.  Make sure you continue drinking plenty of fluids, especially if you are not eating as much.  Continue taking prenatal gummies.  Tylenol as needed for headache.    Follow up with your OBGYN as planned.    Return to this ED if you begin with severe pelvic pain or lower abdominal pain, if you begin with heavy vaginal bleeding, if you develop fever, if you continue with vomiting, if unable to eat or drink, if worsening headache despite current plan, if any other problems occur.

## 2025-07-16 ENCOUNTER — TELEPHONE (OUTPATIENT)
Dept: OBSTETRICS AND GYNECOLOGY | Facility: CLINIC | Age: 25
End: 2025-07-16
Payer: MEDICAID

## 2025-07-16 ENCOUNTER — LAB VISIT (OUTPATIENT)
Dept: LAB | Facility: HOSPITAL | Age: 25
End: 2025-07-16
Attending: STUDENT IN AN ORGANIZED HEALTH CARE EDUCATION/TRAINING PROGRAM
Payer: MEDICAID

## 2025-07-16 DIAGNOSIS — N91.2 AMENORRHEA: Primary | ICD-10-CM

## 2025-07-16 DIAGNOSIS — N91.2 AMENORRHEA: ICD-10-CM

## 2025-07-16 LAB
BACTERIA UR CULT: NORMAL
HCG INTACT+B SERPL-ACNC: 31.65 MIU/ML

## 2025-07-16 PROCEDURE — 36415 COLL VENOUS BLD VENIPUNCTURE: CPT

## 2025-07-16 PROCEDURE — 84702 CHORIONIC GONADOTROPIN TEST: CPT

## 2025-07-17 ENCOUNTER — TELEPHONE (OUTPATIENT)
Dept: OBSTETRICS AND GYNECOLOGY | Facility: CLINIC | Age: 25
End: 2025-07-17
Payer: MEDICAID

## 2025-07-17 NOTE — TELEPHONE ENCOUNTER
Spoke with patient regarding decreasing beta HCG level in setting of vaginal bleeding. Patient reports bleeding is similar to normal menses and denies cramping. She states this was a planned and desired pregnancy. Recommend patient take pregnancy test at home in two weeks and notify clinic of results. Discussed ED precautions. All questions answered.    Zahida Childers M.D.

## 2025-07-18 LAB
C TRACH DNA SPEC QL NAA+PROBE: NOT DETECTED
CTGC SOURCE (OHS) ORD-325: NORMAL
N GONORRHOEA DNA UR QL NAA+PROBE: NOT DETECTED

## 2025-07-23 ENCOUNTER — NURSE TRIAGE (OUTPATIENT)
Dept: ADMINISTRATIVE | Facility: CLINIC | Age: 25
End: 2025-07-23
Payer: MEDICAID

## 2025-07-24 ENCOUNTER — TELEPHONE (OUTPATIENT)
Dept: OBSTETRICS AND GYNECOLOGY | Facility: CLINIC | Age: 25
End: 2025-07-24
Payer: MEDICAID

## 2025-07-24 NOTE — TELEPHONE ENCOUNTER
Pt stated she was told by MD that she had a chemical pregnancy. Pt stated her cycle just went up 7/21. It started on 7/13. Pt stated she took another pregnancy test and it was still positive. Pt wanted to know if she needed to go to the ER? Informed pt she did not. Md told her to retake a pregnancy test 2 weeks from the 17th not today. Pt verbalized understanding. Pt is requesting a call back from MD on tomorrow. Care advice recommends pt call md within 24 hours. Pt is awaiting a return call. Please call and advise pt.  Reason for Disposition   Lab or radiology calling with test results    Protocols used: PCP Call - No Triage-A-

## 2025-07-29 ENCOUNTER — OFFICE VISIT (OUTPATIENT)
Dept: OBSTETRICS AND GYNECOLOGY | Facility: CLINIC | Age: 25
End: 2025-07-29
Payer: MEDICAID

## 2025-07-29 DIAGNOSIS — O46.90 VAGINAL BLEEDING IN PREGNANCY: Primary | ICD-10-CM

## 2025-07-29 PROCEDURE — 98005 SYNCH AUDIO-VIDEO EST LOW 20: CPT | Mod: TH,95,, | Performed by: STUDENT IN AN ORGANIZED HEALTH CARE EDUCATION/TRAINING PROGRAM

## 2025-07-29 NOTE — PROGRESS NOTES
The patient location is: [place]  The chief complaint leading to consultation is: f/u miscarriage  Visit type: Virtual visit with synchronous audio and video  Total time spent with patient: 5 minutes    Each patient to whom he or she provides medical services by telemedicine is:  (1) informed of the relationship between the physician and patient and the respective role of any other health care provider with respect to management of the patient; and (2) notified that he or she may decline to receive medical services by telemedicine and may withdraw from such care at any time.    Past medical, surgical, social, family, and obstetric histories; medications; prior records and results; and available outside records were reviewed and updated in the EMR.  Pertinent findings were noted below.    Reason for Visit   Possible Pregnancy    HPI   25 y.o. female  who presents today for miscarriage follow up. She reports having bleeding similar to normal menses -. She took a pregnancy test on  and reports it remained positive. She denies fever, chills, pain, or bleeding at this time.    Beta HCG trend: 45 () > 31 ()    Assessment and Plan   Vaginal bleeding in pregnancy  -     HCG, Quantitative; Future; Expected date: 2025      Will obtain repeat beta HCG for trend in setting of positive pregnancy test  Counseled patient that expectant management can take up to 8 weeks. Patient does not desire expectant management and would like medical or surgical management pending HCG results    Zahida Childers M.D.

## 2025-07-30 ENCOUNTER — LAB VISIT (OUTPATIENT)
Dept: LAB | Facility: HOSPITAL | Age: 25
End: 2025-07-30
Attending: STUDENT IN AN ORGANIZED HEALTH CARE EDUCATION/TRAINING PROGRAM
Payer: MEDICAID

## 2025-07-30 ENCOUNTER — PATIENT MESSAGE (OUTPATIENT)
Dept: OBSTETRICS AND GYNECOLOGY | Facility: CLINIC | Age: 25
End: 2025-07-30
Payer: MEDICAID

## 2025-07-30 DIAGNOSIS — O46.90 VAGINAL BLEEDING IN PREGNANCY: ICD-10-CM

## 2025-07-30 LAB — HCG INTACT+B SERPL-ACNC: 9.84 MIU/ML

## 2025-07-30 PROCEDURE — 84702 CHORIONIC GONADOTROPIN TEST: CPT

## 2025-07-30 PROCEDURE — 36415 COLL VENOUS BLD VENIPUNCTURE: CPT

## 2025-08-05 ENCOUNTER — OFFICE VISIT (OUTPATIENT)
Dept: OBSTETRICS AND GYNECOLOGY | Facility: CLINIC | Age: 25
End: 2025-08-05
Payer: MEDICAID

## 2025-08-05 ENCOUNTER — LAB VISIT (OUTPATIENT)
Dept: LAB | Facility: HOSPITAL | Age: 25
End: 2025-08-05
Payer: MEDICAID

## 2025-08-05 VITALS
WEIGHT: 174.19 LBS | HEIGHT: 64 IN | DIASTOLIC BLOOD PRESSURE: 72 MMHG | SYSTOLIC BLOOD PRESSURE: 118 MMHG | BODY MASS INDEX: 29.74 KG/M2

## 2025-08-05 DIAGNOSIS — O02.0 ANEMBRYONIC PREGNANCY: Primary | ICD-10-CM

## 2025-08-05 DIAGNOSIS — O02.0 ANEMBRYONIC PREGNANCY: ICD-10-CM

## 2025-08-05 LAB — HCG INTACT+B SERPL-ACNC: 5.1 MIU/ML

## 2025-08-05 PROCEDURE — 3078F DIAST BP <80 MM HG: CPT | Mod: CPTII,,, | Performed by: STUDENT IN AN ORGANIZED HEALTH CARE EDUCATION/TRAINING PROGRAM

## 2025-08-05 PROCEDURE — 99213 OFFICE O/P EST LOW 20 MIN: CPT | Mod: PBBFAC,TH | Performed by: STUDENT IN AN ORGANIZED HEALTH CARE EDUCATION/TRAINING PROGRAM

## 2025-08-05 PROCEDURE — 36415 COLL VENOUS BLD VENIPUNCTURE: CPT

## 2025-08-05 PROCEDURE — 84702 CHORIONIC GONADOTROPIN TEST: CPT

## 2025-08-05 PROCEDURE — 3008F BODY MASS INDEX DOCD: CPT | Mod: CPTII,,, | Performed by: STUDENT IN AN ORGANIZED HEALTH CARE EDUCATION/TRAINING PROGRAM

## 2025-08-05 PROCEDURE — 1159F MED LIST DOCD IN RCRD: CPT | Mod: CPTII,,, | Performed by: STUDENT IN AN ORGANIZED HEALTH CARE EDUCATION/TRAINING PROGRAM

## 2025-08-05 PROCEDURE — 3074F SYST BP LT 130 MM HG: CPT | Mod: CPTII,,, | Performed by: STUDENT IN AN ORGANIZED HEALTH CARE EDUCATION/TRAINING PROGRAM

## 2025-08-05 PROCEDURE — 99999 PR PBB SHADOW E&M-EST. PATIENT-LVL III: CPT | Mod: PBBFAC,,, | Performed by: STUDENT IN AN ORGANIZED HEALTH CARE EDUCATION/TRAINING PROGRAM

## 2025-08-05 PROCEDURE — 99214 OFFICE O/P EST MOD 30 MIN: CPT | Mod: S$PBB,TH,, | Performed by: STUDENT IN AN ORGANIZED HEALTH CARE EDUCATION/TRAINING PROGRAM

## 2025-08-05 RX ORDER — PROMETHAZINE HYDROCHLORIDE AND DEXTROMETHORPHAN HYDROBROMIDE 6.25; 15 MG/5ML; MG/5ML
5 SYRUP ORAL NIGHTLY
COMMUNITY
Start: 2025-03-10

## 2025-08-05 RX ORDER — FLUTICASONE PROPIONATE 50 MCG
1 SPRAY, SUSPENSION (ML) NASAL 2 TIMES DAILY
COMMUNITY
Start: 2025-03-10

## 2025-08-05 RX ORDER — ALBUTEROL SULFATE 90 UG/1
INHALANT RESPIRATORY (INHALATION)
COMMUNITY
Start: 2025-03-10

## 2025-08-05 NOTE — PROGRESS NOTES
Gynecology    SUBJECTIVE:     Chief Complaint: Consult       History of Present Illness:  Catrachito Ritter is a 26 yo  here today for follow up. She reports she is doing well and denies complaints. She has not had vaginal bleeding since . Beta HCG trend 45.99 > 31.65 > 9.84 ()  She states she has not taken a pregnancy test at home.  She is interested in future fertility.    Review of Systems:  Review of Systems   Constitutional:  Negative for chills and fever.   Respiratory:  Negative for shortness of breath.    Cardiovascular:  Negative for chest pain.   Gastrointestinal:  Negative for abdominal pain, nausea and vomiting.   Endocrine: Negative for hot flashes.   Genitourinary:  Negative for dysuria and vaginal bleeding.   Integumentary:  Negative for breast mass, nipple discharge and breast skin changes.   Neurological:  Negative for headaches.   Hematological:  Does not bruise/bleed easily.   Psychiatric/Behavioral:  Negative for depression.    Breast: Negative for mass, mastodynia, nipple discharge and skin changes       OBJECTIVE:     Physical Exam:  Physical Exam  Constitutional:       Appearance: Normal appearance.   HENT:      Head: Normocephalic and atraumatic.   Eyes:      Conjunctiva/sclera: Conjunctivae normal.      Pupils: Pupils are equal, round, and reactive to light.   Cardiovascular:      Rate and Rhythm: Normal rate and regular rhythm.   Pulmonary:      Effort: Pulmonary effort is normal. No respiratory distress.   Abdominal:      General: Abdomen is flat.      Palpations: Abdomen is soft.   Musculoskeletal:         General: Normal range of motion.      Cervical back: Normal range of motion.   Neurological:      Mental Status: She is alert.   Psychiatric:         Mood and Affect: Mood normal.         Thought Content: Thought content normal.         ASSESSMENT:       ICD-10-CM ICD-9-CM    1. Anembryonic pregnancy  O02.0 631.8 HCG, Quantitative      prenatal 26-iron ps-folic-dha  (VITAFOL-ONE) 29 mg iron- 1 mg-200 mg Cap             Plan:      Catrachito was seen today for consult.    Diagnoses and all orders for this visit:    Anembryonic pregnancy  -     Counseled patient on follow beta HCG to <5 and waiting until after next menstrual cycle to start TTC. Patient voiced understanding  -     HCG, Quantitative; Future  -     prenatal 26-iron ps-folic-dha (VITAFOL-ONE) 29 mg iron- 1 mg-200 mg Cap; Take 1 capsule by mouth once daily.        Orders Placed This Encounter   Procedures    HCG, Quantitative       Zahida Childers

## 2025-08-07 ENCOUNTER — TELEPHONE (OUTPATIENT)
Dept: OBSTETRICS AND GYNECOLOGY | Facility: CLINIC | Age: 25
End: 2025-08-07
Payer: MEDICAID

## 2025-08-07 NOTE — TELEPHONE ENCOUNTER
Spoke to pt and let her know we only see women   Homeless (Economic hardship "doubled up", shelter, hotel or motel, car, park, bus station, train station, campsite, transitional housing or other temporary living situation)